# Patient Record
Sex: MALE | Race: BLACK OR AFRICAN AMERICAN | Employment: FULL TIME | URBAN - METROPOLITAN AREA
[De-identification: names, ages, dates, MRNs, and addresses within clinical notes are randomized per-mention and may not be internally consistent; named-entity substitution may affect disease eponyms.]

---

## 2017-05-26 ENCOUNTER — HOSPITAL ENCOUNTER (EMERGENCY)
Age: 42
Discharge: HOME | End: 2017-05-26
Attending: EMERGENCY MEDICINE | Admitting: EMERGENCY MEDICINE
Payer: OTHER MISCELLANEOUS

## 2017-05-26 ENCOUNTER — APPOINTMENT (OUTPATIENT)
Dept: GENERAL RADIOLOGY | Age: 42
End: 2017-05-26
Attending: EMERGENCY MEDICINE
Payer: OTHER MISCELLANEOUS

## 2017-05-26 VITALS
SYSTOLIC BLOOD PRESSURE: 154 MMHG | OXYGEN SATURATION: 100 % | TEMPERATURE: 98.6 F | HEART RATE: 74 BPM | RESPIRATION RATE: 18 BRPM | DIASTOLIC BLOOD PRESSURE: 105 MMHG

## 2017-05-26 DIAGNOSIS — S61.219A LACERATION OF FINGER, INITIAL ENCOUNTER: ICD-10-CM

## 2017-05-26 DIAGNOSIS — M79.645 FINGER PAIN, LEFT: ICD-10-CM

## 2017-05-26 DIAGNOSIS — S62.609B: Primary | ICD-10-CM

## 2017-05-26 PROCEDURE — 99284 EMERGENCY DEPT VISIT MOD MDM: CPT

## 2017-05-26 PROCEDURE — 90715 TDAP VACCINE 7 YRS/> IM: CPT | Performed by: EMERGENCY MEDICINE

## 2017-05-26 PROCEDURE — 96372 THER/PROPH/DIAG INJ SC/IM: CPT

## 2017-05-26 PROCEDURE — 90471 IMMUNIZATION ADMIN: CPT

## 2017-05-26 PROCEDURE — 74011000250 HC RX REV CODE- 250: Performed by: NURSE PRACTITIONER

## 2017-05-26 PROCEDURE — 73140 X-RAY EXAM OF FINGER(S): CPT

## 2017-05-26 PROCEDURE — 77030018836 HC SOL IRR NACL ICUM -A

## 2017-05-26 PROCEDURE — 77030008323 HC SPLNT FNGR GTR DJOR -A

## 2017-05-26 PROCEDURE — 74011250637 HC RX REV CODE- 250/637: Performed by: NURSE PRACTITIONER

## 2017-05-26 PROCEDURE — 74011250636 HC RX REV CODE- 250/636: Performed by: EMERGENCY MEDICINE

## 2017-05-26 PROCEDURE — 74011250636 HC RX REV CODE- 250/636: Performed by: NURSE PRACTITIONER

## 2017-05-26 RX ORDER — LIDOCAINE HYDROCHLORIDE 10 MG/ML
20 INJECTION INFILTRATION; PERINEURAL ONCE
Status: COMPLETED | OUTPATIENT
Start: 2017-05-26 | End: 2017-05-26

## 2017-05-26 RX ORDER — CEPHALEXIN 500 MG/1
500 CAPSULE ORAL 4 TIMES DAILY
Qty: 40 CAP | Refills: 0 | Status: SHIPPED | OUTPATIENT
Start: 2017-05-26 | End: 2017-06-05

## 2017-05-26 RX ORDER — OXYCODONE AND ACETAMINOPHEN 7.5; 325 MG/1; MG/1
1 TABLET ORAL
Qty: 20 TAB | Refills: 0 | Status: SHIPPED | OUTPATIENT
Start: 2017-05-26

## 2017-05-26 RX ORDER — OXYCODONE AND ACETAMINOPHEN 5; 325 MG/1; MG/1
2 TABLET ORAL
Status: COMPLETED | OUTPATIENT
Start: 2017-05-26 | End: 2017-05-26

## 2017-05-26 RX ADMIN — OXYCODONE HYDROCHLORIDE AND ACETAMINOPHEN 2 TABLET: 5; 325 TABLET ORAL at 18:18

## 2017-05-26 RX ADMIN — LIDOCAINE HYDROCHLORIDE 20 ML: 10 INJECTION, SOLUTION INFILTRATION; PERINEURAL at 16:10

## 2017-05-26 RX ADMIN — CEFAZOLIN SODIUM 1000 MG: 1 INJECTION, POWDER, FOR SOLUTION INTRAMUSCULAR; INTRAVENOUS at 16:37

## 2017-05-26 RX ADMIN — TETANUS TOXOID, REDUCED DIPHTHERIA TOXOID AND ACELLULAR PERTUSSIS VACCINE, ADSORBED 0.5 ML: 5; 2.5; 8; 8; 2.5 SUSPENSION INTRAMUSCULAR at 17:04

## 2017-05-26 NOTE — ED NOTES
I have reviewed discharge instructions with the patient. The patient verbalized understanding. Patient made aware that he should not drive while taking prescription narcotics  Patient armband removed and shredded.

## 2017-05-26 NOTE — DISCHARGE INSTRUCTIONS
Finger Fracture: Care Instructions  Your Care Instructions    Breaks in the bones of the finger usually heal well in about 3 to 4 weeks. The pain and swelling from a broken finger can last for weeks. But it should steadily improve, starting a few days after you break it. It is very important that you wear and take care of the cast or splint exactly as your doctor tells you to so that your finger heals properly and does not end up crooked. Wearing a splint may interfere with your normal activities. Ask for help with daily tasks if you need it. You heal best when you take good care of yourself. Eat a variety of healthy foods, and don't smoke. Follow-up care is a key part of your treatment and safety. Be sure to make and go to all appointments, and call your doctor if you are having problems. It's also a good idea to know your test results and keep a list of the medicines you take. How can you care for yourself at home? · If your doctor put a splint on your finger, wear the splint exactly as directed. Do not remove it until your doctor says that you can. · Keep your hand raised above the level of your heart as much as you can. This will help reduce swelling. · Put ice or a cold pack on your finger for 10 to 20 minutes at a time. Try to do this every 1 to 2 hours for the next 3 days (when you are awake) or until the swelling goes down. Put a thin cloth between the ice and your skin. Keep the splint dry. · Be safe with medicines. Take pain medicines exactly as directed. ¨ If the doctor gave you a prescription medicine for pain, take it as prescribed. ¨ If you are not taking a prescription pain medicine, ask your doctor if you can take an over-the-counter medicine. When should you call for help? Call 911 anytime you think you may need emergency care. For example, call if:  · Your finger is cool or pale or changes color.   Call your doctor now or seek immediate medical care if:  · Your pain gets much worse.  · You have tingling, weakness, or numbness in your finger. · You have signs of infection, such as:  ¨ Increased pain, swelling, warmth, or redness. ¨ Red streaks leading from the area. ¨ Pus draining from the area. ¨ Swollen lymph nodes in your neck, armpits, or groin. ¨ A fever. Watch closely for changes in your health, and be sure to contact your doctor if:  · Your finger is not steadily improving. Where can you learn more? Go to http://ange-deyanira.info/. Enter C898 in the search box to learn more about \"Finger Fracture: Care Instructions. \"  Current as of: May 23, 2016  Content Version: 11.2  © 9776-0399 [employer]. Care instructions adapted under license by [employer] (which disclaims liability or warranty for this information). If you have questions about a medical condition or this instruction, always ask your healthcare professional. [place] [address] any warranty or liability for your use of this information. Vidyo Activation    Thank you for requesting access to [employer]. Please follow the instructions below to securely access and download your online medical record. Vidyo allows you to send messages to your doctor, view your test results, renew your prescriptions, schedule appointments, and more. How Do I Sign Up? 1. In your internet browser, go to [email or web address]  2. Click on the First Time User? Click Here link in the Sign In box. You will be redirect to the New Member Sign Up page. 3. Enter your Vidyo Access Code exactly as it appears below. You will not need to use this code after youve completed the sign-up process. If you do not sign up before the expiration date, you must request a new code. Vidyo Access Code: [email or web address]  Expires: 2017  6:30 PM (This is the date your Vidyo access code will )    4.  Enter the last four digits of your Social Security Number (xxxx) and Date of Birth (mm/dd/yyyy) as indicated and click Submit. You will be taken to the next sign-up page. 5. Create a NTRglobal ID. This will be your NTR[employer] login ID and cannot be changed, so think of one that is secure and easy to remember. 6. Create a NTR[employer] password. You can change your password at any time. 7. Enter your Password Reset Question and Answer. This can be used at a later time if you forget your password. 8. Enter your e-mail address. You will receive e-mail notification when new information is available in [address] 9. Click Sign Up. You can now view and download portions of your medical record. 10. Click the Download Summary menu link to download a portable copy of your medical information. Additional Information    If you have questions, please visit the Frequently Asked Questions section of the NTR[employer] website at [email or web address]. [employer]. "[employer]/. Remember, NTR[employer] is NOT to be used for urgent needs. For medical emergencies, dial 911. Keep wound clean and dry. Change dressing daily. Return at once for increasing pain swelling or bleeding. Be sure to take Antibiotics as directed. Be sure to call Dr Lisa Isbell first thing on Tuesday.

## 2017-05-26 NOTE — ED NOTES
I performed a brief evaluation, including history and physical, of the patient here in triage and I have determined that pt will need further treatment and evaluation from the fast track provider. I have placed initial orders to help in expediting patients care.      May 26, 2017 at 2:50 PM - Shannon Troncoso DO

## 2017-05-26 NOTE — ED PROVIDER NOTES
HPI Comments: 2:50 PM Ulysses Norton is a 42 y.o. male with no pertinent medical history who presents to the ED c/o L finger laceration which happened about 30 minutes ago. Pt states he was working on a metal bed frame when it malfunctioned and sliced his finger. Pt is unsure of when his last tetanus shot was. Pt denies any other symptoms at this time. PCP: No primary care provider on file. The history is provided by the patient. History limited by: No communication barrier. No past medical history on file. No past surgical history on file. No family history on file. Social History     Social History    Marital status: N/A     Spouse name: N/A    Number of children: N/A    Years of education: N/A     Occupational History    Not on file. Social History Main Topics    Smoking status: Not on file    Smokeless tobacco: Not on file    Alcohol use Not on file    Drug use: Not on file    Sexual activity: Not on file     Other Topics Concern    Not on file     Social History Narrative         ALLERGIES: Review of patient's allergies indicates no known allergies. Review of Systems   Constitutional: Negative. Negative for fever. HENT: Negative. Negative for congestion. Eyes: Negative. Respiratory: Negative. Negative for cough and shortness of breath. Cardiovascular: Negative. Negative for chest pain and leg swelling. Gastrointestinal: Negative. Negative for abdominal pain, nausea and vomiting. Endocrine: Negative. Genitourinary: Negative. Negative for dysuria. Musculoskeletal: Negative. Skin: Positive for wound (Laceration L index finger). Allergic/Immunologic: Negative. Neurological: Negative. Negative for light-headedness and headaches. Psychiatric/Behavioral: Negative. All other systems reviewed and are negative. There were no vitals filed for this visit. Physical Exam   Constitutional: He is oriented to person, place, and time. He appears well-developed and well-nourished. No distress. HENT:   Head: Normocephalic and atraumatic. Eyes: Pupils are equal, round, and reactive to light. Neck: Normal range of motion. Neck supple. Cardiovascular: Normal rate and regular rhythm. Pulmonary/Chest: Effort normal and breath sounds normal. He has no wheezes. He has no rales. Abdominal: Soft. Bowel sounds are normal. There is no tenderness. There is no rebound and no guarding. Genitourinary:   Genitourinary Comments: NE   Musculoskeletal: He exhibits tenderness and deformity (the left hand 2nd digit distal phalanx is angulated midlly laterally. ). Neurological: He is alert and oriented to person, place, and time. No cranial nerve deficit. Coordination normal.   Skin: Skin is warm and dry. Near circumferential laceration to the distal phalnx of the 2nd finger left hand. The wound appears to begin on the dorsal aspect just proximal to the nailbed and wrap bilaterally with only approx 1 or 2 cm of unaffected skin on the anterior aspect. There is a fracture of the distal phalanx and while the bone is visible in the wound, the bone has not come through the wound. Psychiatric: He has a normal mood and affect. Nursing note and vitals reviewed. MDM  Number of Diagnoses or Management Options  Finger pain, left:   Laceration of finger, initial encounter:   Open fracture of phalanx of finger of left hand, initial encounter:   Diagnosis management comments: CONSULT:  Spoke with Capri Emerson PA-C about this Pt. I had originally planned flush well, reduce angulation tack the skin closed and splint the digit. Pt to follow up by calling for an appointment on Tuesday. However, the wound appears to involved to tack in place and have the Pt subsist until next Tuesday or even longer.   Capri Emerson advised she does not have a hand surgeon on, and if the Pt needs orthopedic intervention now, he will have to be sent to [facility] NP  5:05 PM    PROGRESS NOTE:   Spoke with the [employer]. They will have their hand surgeon call me back for further discussion. David Fuentes NP  5:10 PM    CONSULT:   Spoke with Dr Le Danielle at [facility].  He advised that flushing the wound, placing the Pt on ABX and having him follow up on Tuesday David Fuentes NP  6:23 PM           Amount and/or Complexity of Data Reviewed  Tests in the radiology section of CPT®: ordered and reviewed  Discuss the patient with other providers: yes (Dr Alix Ortiz, Dr Le Danielle.  )    Risk of Complications, Morbidity, and/or Mortality  Presenting problems: moderate  Diagnostic procedures: moderate  Management options: moderate    Patient Progress  Patient progress: stable    ED Course       Procedures    Diagnosis:   1. Open fracture of phalanx of finger of left hand, initial encounter    2. Finger pain, left    3. Laceration of finger, initial encounter          Disposition:   Discharged to Home. Follow-up Information     Follow up With Details Comments Magy Boyd MD Call on Tuesday for follow up. [address] [place] [address]  [phone]            Patient's Medications   Start Taking    CEPHALEXIN (KEFLEX) 500 MG CAPSULE    Take 1 Cap by mouth four (4) times daily for 10 days. OXYCODONE-ACETAMINOPHEN (PERCOCET) 7.5-325 MG PER TABLET    Take 1 Tab by mouth every six (6) hours as needed for Pain. Max Daily Amount: 4 Tabs.    Continue Taking    No medications on file   These Medications have changed    No medications on file   Stop Taking    No medications on file